# Patient Record
Sex: FEMALE | Race: WHITE | Employment: FULL TIME | URBAN - METROPOLITAN AREA
[De-identification: names, ages, dates, MRNs, and addresses within clinical notes are randomized per-mention and may not be internally consistent; named-entity substitution may affect disease eponyms.]

---

## 2022-01-05 ENCOUNTER — OFFICE VISIT (OUTPATIENT)
Dept: URGENT CARE | Facility: CLINIC | Age: 37
End: 2022-01-05
Payer: COMMERCIAL

## 2022-01-05 VITALS — HEART RATE: 115 BPM | TEMPERATURE: 98 F | RESPIRATION RATE: 16 BRPM | OXYGEN SATURATION: 96 %

## 2022-01-05 DIAGNOSIS — U07.1 COVID-19: Primary | ICD-10-CM

## 2022-01-05 PROCEDURE — 99203 OFFICE O/P NEW LOW 30 MIN: CPT | Performed by: FAMILY MEDICINE

## 2022-01-05 RX ORDER — FAMOTIDINE 20 MG/1
20 TABLET, FILM COATED ORAL 2 TIMES DAILY
COMMUNITY

## 2022-01-05 RX ORDER — ASPIRIN 81 MG/1
81 TABLET, CHEWABLE ORAL DAILY
COMMUNITY

## 2022-01-05 NOTE — PROGRESS NOTES
[facility]        NAME: Roly Bautista is a 29 y o  female  : 1985    MRN: [id number]  DATE: 2022  TIME: 9:41 AM    Assessment and Plan   COVID-19 [U07 1]  1  COVID-19       Advised on continued supportive measures  Patient Instructions     Follow up with PCP in 3-5 days  Proceed to  ER if symptoms worsen  Chief Complaint     Chief Complaint   Patient presents with    Cold Like Symptoms     + Covid , c/o feeling SOB, dry cough, denies fever, 23 weeks pregnant         History of Present Illness       71-year-old 11 month pregnant female presents today after testing positive for COVID-19 last week  Reports feeling short of breath with a dry cough and some chest pain  Has been taking Tylenol per the instruction of her obstetrician and drinking about 1 gal of water daily  Review of Systems   Review of Systems   Constitutional: Positive for fever  Negative for chills  HENT: Negative for congestion, rhinorrhea and sore throat  Respiratory: Positive for cough and shortness of breath  Negative for wheezing  Cardiovascular: Positive for chest pain  Gastrointestinal: Negative for abdominal pain and nausea  Musculoskeletal: Negative for arthralgias  Skin: Negative for rash  Neurological: Negative for dizziness and headaches  Current Medications       Current Outpatient Medications:     aspirin 81 mg chewable tablet, Chew 81 mg daily, Disp: , Rfl:     famotidine (PEPCID) 20 mg tablet, Take 20 mg by mouth 2 (two) times a day, Disp: , Rfl:     Prenatal Vit-Fe Fumarate-FA (PRENATAL VITAMIN PO), Take by mouth, Disp: , Rfl:     Current Allergies     Allergies as of 2022    (No Known Allergies)            The following portions of the patient's history were reviewed and updated as appropriate: allergies, current medications, past family history, past medical history, past social history, past surgical history and problem list      History reviewed   No pertinent past medical history  Past Surgical History:   Procedure Laterality Date    APPENDECTOMY      SPINE SURGERY  2018       No family history on file  Medications have been verified  Objective   Pulse (!) 115   Temp 98 °F (36 7 °C)   Resp 16   SpO2 96%   No LMP recorded  Patient is pregnant  Physical Exam     Physical Exam  Vitals and nursing note reviewed  Constitutional:       General: She is in acute distress  Appearance: Normal appearance  She is not ill-appearing, toxic-appearing or diaphoretic  HENT:      Head: Normocephalic and atraumatic  Eyes:      General:         Right eye: No discharge  Left eye: No discharge  Conjunctiva/sclera: Conjunctivae normal    Cardiovascular:      Rate and Rhythm: Regular rhythm  Tachycardia present  Pulmonary:      Breath sounds: Normal breath sounds  No wheezing, rhonchi or rales  Comments: End expiratory coughing  Skin:     General: Skin is warm  Findings: No erythema  Neurological:      General: No focal deficit present  Mental Status: She is alert and oriented to person, place, and time  Psychiatric:         Mood and Affect: Mood normal          Behavior: Behavior normal          Thought Content:  Thought content normal          Judgment: Judgment normal

## 2024-05-28 ENCOUNTER — TELEPHONE (OUTPATIENT)
Dept: NEUROLOGY | Facility: CLINIC | Age: 39
End: 2024-05-28

## 2024-05-28 NOTE — TELEPHONE ENCOUNTER
5/28/24, 10:15    Pt left a vm. She would like to schedule a NP appt w/ Dr Montes De Oca bec she believes she has herniated disc.    [phone]